# Patient Record
Sex: MALE | Race: WHITE | NOT HISPANIC OR LATINO | URBAN - METROPOLITAN AREA
[De-identification: names, ages, dates, MRNs, and addresses within clinical notes are randomized per-mention and may not be internally consistent; named-entity substitution may affect disease eponyms.]

---

## 2024-02-27 VITALS
OXYGEN SATURATION: 99 % | DIASTOLIC BLOOD PRESSURE: 73 MMHG | WEIGHT: 142.42 LBS | TEMPERATURE: 98 F | HEIGHT: 67 IN | SYSTOLIC BLOOD PRESSURE: 120 MMHG | RESPIRATION RATE: 16 BRPM | HEART RATE: 97 BPM

## 2024-02-27 NOTE — ASU PREOP CHECKLIST - 3.
Pt stated that he has severe Anxiety and would like to be medicated before going into Sx. As per pt, Surgeon is aware of this issue. Told pt that the Anesthesiologist will see him prior to Sx.

## 2024-02-28 ENCOUNTER — INPATIENT (INPATIENT)
Facility: HOSPITAL | Age: 29
LOS: 1 days | Discharge: HOME HEALTH | DRG: 572 | End: 2024-03-01
Attending: SURGERY | Admitting: SURGERY
Payer: COMMERCIAL

## 2024-02-28 DIAGNOSIS — Z98.890 OTHER SPECIFIED POSTPROCEDURAL STATES: Chronic | ICD-10-CM

## 2024-02-28 LAB
GRAM STN FLD: ABNORMAL
GRAM STN FLD: SIGNIFICANT CHANGE UP
SPECIMEN SOURCE: SIGNIFICANT CHANGE UP

## 2024-02-28 PROCEDURE — 88304 TISSUE EXAM BY PATHOLOGIST: CPT | Mod: 26

## 2024-02-28 RX ORDER — CEFAZOLIN SODIUM 1 G
2000 VIAL (EA) INJECTION EVERY 8 HOURS
Refills: 0 | Status: COMPLETED | OUTPATIENT
Start: 2024-02-29 | End: 2024-02-29

## 2024-02-28 RX ORDER — SODIUM CHLORIDE 9 MG/ML
250 INJECTION, SOLUTION INTRAVENOUS ONCE
Refills: 0 | Status: COMPLETED | OUTPATIENT
Start: 2024-02-28 | End: 2024-02-28

## 2024-02-28 RX ORDER — OXYCODONE HYDROCHLORIDE 5 MG/1
5 TABLET ORAL EVERY 6 HOURS
Refills: 0 | Status: DISCONTINUED | OUTPATIENT
Start: 2024-02-28 | End: 2024-03-01

## 2024-02-28 RX ORDER — ACETAMINOPHEN 500 MG
1000 TABLET ORAL ONCE
Refills: 0 | Status: COMPLETED | OUTPATIENT
Start: 2024-02-28 | End: 2024-02-28

## 2024-02-28 RX ORDER — OXYCODONE HYDROCHLORIDE 5 MG/1
10 TABLET ORAL EVERY 6 HOURS
Refills: 0 | Status: DISCONTINUED | OUTPATIENT
Start: 2024-02-28 | End: 2024-03-01

## 2024-02-28 RX ORDER — ONDANSETRON 8 MG/1
4 TABLET, FILM COATED ORAL EVERY 6 HOURS
Refills: 0 | Status: DISCONTINUED | OUTPATIENT
Start: 2024-02-28 | End: 2024-03-01

## 2024-02-28 RX ORDER — APREPITANT 80 MG/1
40 CAPSULE ORAL ONCE
Refills: 0 | Status: COMPLETED | OUTPATIENT
Start: 2024-02-28 | End: 2024-02-28

## 2024-02-28 RX ADMIN — APREPITANT 40 MILLIGRAM(S): 80 CAPSULE ORAL at 17:14

## 2024-02-28 RX ADMIN — SODIUM CHLORIDE 1500 MILLILITER(S): 9 INJECTION, SOLUTION INTRAVENOUS at 19:55

## 2024-02-28 RX ADMIN — Medication 1000 MILLIGRAM(S): at 17:14

## 2024-02-28 NOTE — BRIEF OPERATIVE NOTE - OPERATION/FINDINGS
Prone position, prepped and draped. Pilonidal abscess cavity noted at midline, immediately superior to gluteal cleft, with multiple connecting sinuses x4. Cultures taken from all sinuses and sent for speciation. Elliptical incision made overlying central sinus tract, dissection continued to level of muscle with healthy tissue noted at the base. Sinus tracts probed and found to be connecting to central cavity. Purulence expressed. Mechanical debridement performed with iodine packing strips. Pulse lavage with NS mixed with Gentamicin. Hemostasis achieved. Vac and packing placed.

## 2024-02-28 NOTE — ASU DISCHARGE PLAN (ADULT/PEDIATRIC) - CARE PROVIDER_API CALL
Chano Maria  Surgery  [address]  [place] [address]  Phone: [phone]  Fax: [phone]  Follow Up Time:

## 2024-02-28 NOTE — ASU DISCHARGE PLAN (ADULT/PEDIATRIC) - NS MD DC FALL RISK RISK
For information on Fall & Injury Prevention, visit: [email or web address] OR  [email or web address] OR  https://www.cdc.gov/steadi/patient.html

## 2024-02-28 NOTE — ASU DISCHARGE PLAN (ADULT/PEDIATRIC) - ASU DC SPECIAL INSTRUCTIONSFT
Please take pain medication as prescribed.    1. Wash your hands with soap and warm water for at least 15 seconds.   2. Set your supplies on your workspace.  - Non sterile gloves  - Gauze pads if needed to clean or dry the wound  - Foam sponge dressing  - Clean scissors to cut drape and foam to size  3. Put on non-sterile gloves. Remove the old dressing.  4. Gently clean your wound with soap and water. If your doctor ordered cleaning solution, use that to clean your wound.  5. Check your wounds for signs of infection.  6. Open the new foam sponge dressing. Cut it to size. Place it in the wound.  7. Connect the tubing to the sponge dressing.  8. Connect the tubing to the pump unit.  9. Open the drape package. Cut the drape to the size needed.  10. Place the drape over the wound site. Smooth the drape as you stick it around the wound to prevent any wrinkle that may leak.  11. Turn on the VAC pump. Listen and watch for leaks. Use pieces from what you cut off the drape to seal any leaks around the edges of the drape or the tubing.    General Discharge Instructions:  Please resume all regular home medications unless specifically advised not to take a particular medication. Also, please take any new medications as prescribed.  Please get plenty of rest, continue to ambulate several times per day, and drink adequate amounts of fluids. Avoid lifting weights greater than 5-10 lbs until you follow-up with your surgeon, who will instruct you further regarding activity restrictions.  Avoid driving or operating heavy machinery while taking pain medications.  Please follow-up with your surgeon and Primary Care Provider (PCP) as advised.  Incision Care:  *Please call your doctor or nurse practitioner if you have increased pain, swelling, redness, or drainage from the incision site.  *Avoid swimming and baths until your follow-up appointment.  *You may shower, and wash surgical incisions with a mild soap and warm water. Gently pat the area dry.  *If you have staples, they will be removed at your follow-up appointment.  *If you have steri-strips, they will fall off on their own. Please remove any remaining strips 7-10 days after surgery. Please take pain medication as prescribed.    1. Wash your hands with soap and warm water for at least 15 seconds.   2. Set your supplies on your workspace.  - Non sterile gloves  - Gauze pads if needed to clean or dry the wound  - Foam sponge dressing  - Clean scissors to cut drape and foam to size  3. Put on non-sterile gloves. Remove the old dressing.  4. Gently clean your wound with soap and water. If your doctor ordered cleaning solution, use that to clean your wound.  5. Check your wounds for signs of infection.  6. Open the new foam sponge dressing. Cut it to size. Place it in the wound.  7. Connect the tubing to the sponge dressing.  8. Connect the tubing to the pump unit.  9. Open the drape package. Cut the drape to the size needed.  10. Place the drape over the wound site. Smooth the drape as you stick it around the wound to prevent any wrinkle that may leak.  11. Turn on the VAC pump. Listen and watch for leaks. Use pieces from what you cut off the drape to seal any leaks around the edges of the drape or the tubing.    If there is any malfunctions or issues with the wound vac please utilize a wet to dry wound dressing until any issue with the wound vac can be addressed.     General Discharge Instructions:  Please resume all regular home medications unless specifically advised not to take a particular medication. Also, please take any new medications as prescribed.  Please get plenty of rest, continue to ambulate several times per day, and drink adequate amounts of fluids. Avoid lifting weights greater than 5-10 lbs until you follow-up with your surgeon, who will instruct you further regarding activity restrictions.  Avoid driving or operating heavy machinery while taking pain medications.  Please follow-up with your surgeon and Primary Care Provider (PCP) as advised.  Incision Care:  *Please call your doctor or nurse practitioner if you have increased pain, swelling, redness, or drainage from the incision site.  *Avoid swimming and baths until your follow-up appointment.  *You may shower, and wash surgical incisions with a mild soap and warm water. Gently pat the area dry.  *If you have staples, they will be removed at your follow-up appointment.  *If you have steri-strips, they will fall off on their own. Please remove any remaining strips 7-10 days after surgery.

## 2024-02-28 NOTE — BRIEF OPERATIVE NOTE - NSICDXBRIEFPROCEDURE_GEN_ALL_CORE_FT
PROCEDURES:  Complex excision, pilonidal sinus 28-Feb-2024 20:20:25  Amaris Fierro  Extensive excision of pilonidal cyst 28-Feb-2024 20:20:41  Amaris Fierro

## 2024-02-29 RX ORDER — HEPARIN SODIUM 5000 [USP'U]/ML
5000 INJECTION INTRAVENOUS; SUBCUTANEOUS EVERY 8 HOURS
Refills: 0 | Status: DISCONTINUED | OUTPATIENT
Start: 2024-02-29 | End: 2024-03-01

## 2024-02-29 RX ADMIN — Medication 100 MILLIGRAM(S): at 18:29

## 2024-02-29 RX ADMIN — OXYCODONE HYDROCHLORIDE 10 MILLIGRAM(S): 5 TABLET ORAL at 18:29

## 2024-02-29 RX ADMIN — HEPARIN SODIUM 5000 UNIT(S): 5000 INJECTION INTRAVENOUS; SUBCUTANEOUS at 18:29

## 2024-02-29 RX ADMIN — Medication 100 MILLIGRAM(S): at 10:23

## 2024-02-29 RX ADMIN — Medication 100 MILLIGRAM(S): at 02:00

## 2024-02-29 NOTE — DISCHARGE NOTE NURSING/CASE MANAGEMENT/SOCIAL WORK - PATIENT PORTAL LINK FT
You can access the FollowMyHealth Patient Portal offered by [facility] by registering at the following website: [email or web address]. By joining [facility] [facility]’s FollowMyHealth portal, you will also be able to view your health information using other applications (apps) compatible with our system.

## 2024-02-29 NOTE — DISCHARGE NOTE NURSING/CASE MANAGEMENT/SOCIAL WORK - NSDCPEEMAIL_GEN_ALL_CORE
[facility] for Tobacco Control email [email or web address]

## 2024-02-29 NOTE — DISCHARGE NOTE NURSING/CASE MANAGEMENT/SOCIAL WORK - NSDCDMENAME_GEN_ALL_CORE_FT
[employer] (pending acceptance) [employer]

## 2024-02-29 NOTE — DISCHARGE NOTE NURSING/CASE MANAGEMENT/SOCIAL WORK - NSDCPEWEB_GEN_ALL_CORE
[facility] for Tobacco Control website --- [email or web address] website --- [email or web address]

## 2024-03-01 VITALS
RESPIRATION RATE: 17 BRPM | HEART RATE: 75 BPM | DIASTOLIC BLOOD PRESSURE: 75 MMHG | TEMPERATURE: 98 F | SYSTOLIC BLOOD PRESSURE: 114 MMHG | OXYGEN SATURATION: 97 %

## 2024-03-01 LAB
-  CEFTRIAXONE: SIGNIFICANT CHANGE UP
-  PENICILLIN: SIGNIFICANT CHANGE UP
-  VANCOMYCIN: SIGNIFICANT CHANGE UP
GRAM STN FLD: ABNORMAL
METHOD TYPE: SIGNIFICANT CHANGE UP
METHOD TYPE: SIGNIFICANT CHANGE UP

## 2024-03-01 PROCEDURE — 87070 CULTURE OTHR SPECIMN AEROBIC: CPT

## 2024-03-01 PROCEDURE — 87184 SC STD DISK METHOD PER PLATE: CPT

## 2024-03-01 PROCEDURE — 88304 TISSUE EXAM BY PATHOLOGIST: CPT

## 2024-03-01 PROCEDURE — 87205 SMEAR GRAM STAIN: CPT

## 2024-03-01 PROCEDURE — 87181 SC STD AGAR DILUTION PER AGT: CPT

## 2024-03-01 PROCEDURE — 87075 CULTR BACTERIA EXCEPT BLOOD: CPT

## 2024-03-01 RX ADMIN — OXYCODONE HYDROCHLORIDE 10 MILLIGRAM(S): 5 TABLET ORAL at 09:59

## 2024-03-01 RX ADMIN — HEPARIN SODIUM 5000 UNIT(S): 5000 INJECTION INTRAVENOUS; SUBCUTANEOUS at 01:50

## 2024-03-01 RX ADMIN — OXYCODONE HYDROCHLORIDE 10 MILLIGRAM(S): 5 TABLET ORAL at 10:59

## 2024-03-01 NOTE — PROGRESS NOTE ADULT - ASSESSMENT
28M PMHx anxiety, severe pilonidal disease s/p multiple I&Ds no PSHx now s/p elective pilonidal abscess cavity excision and debridement of tracts with vac placement (2/28).    23 hr obs   Regular diet  Pain/nausea control prn  Ancef x24 hours  SCDs/HSQ  OOBA/IS  Toradol  DC home today w/ vac   
28M PMHx anxiety, severe pilonidal disease s/p multiple I&Ds no PSHx now s/p elective pilonidal abscess cavity excision and debridement of tracts with vac placement (2/28).    Regular diet  Ancef x24 hours  SCDs/OOBA/IS  Pain and nausea control PRN  
28M PMHx anxiety, severe pilonidal disease s/p multiple I&Ds no PSHx now s/p elective pilonidal abscess cavity excision and debridement of tracts with vac placement (2/28)    Regular diet  Ancef x24 hours  SCDs/OOBA/IS  Toradol  Pain and nausea control PRN  23 hour obs, DC to home after afternoon meeting with Dr. Maria

## 2024-03-02 LAB
CULTURE RESULTS: ABNORMAL
ORGANISM # SPEC MICROSCOPIC CNT: ABNORMAL
ORGANISM # SPEC MICROSCOPIC CNT: ABNORMAL
ORGANISM # SPEC MICROSCOPIC CNT: SIGNIFICANT CHANGE UP
SPECIMEN SOURCE: SIGNIFICANT CHANGE UP

## 2024-03-03 LAB
CULTURE RESULTS: ABNORMAL
SPECIMEN SOURCE: SIGNIFICANT CHANGE UP

## 2024-03-08 LAB — SURGICAL PATHOLOGY STUDY: SIGNIFICANT CHANGE UP

## 2024-03-11 DIAGNOSIS — B96.89 OTHER SPECIFIED BACTERIAL AGENTS AS THE CAUSE OF DISEASES CLASSIFIED ELSEWHERE: ICD-10-CM

## 2024-03-11 DIAGNOSIS — F41.9 ANXIETY DISORDER, UNSPECIFIED: ICD-10-CM

## 2024-03-11 DIAGNOSIS — L05.01 PILONIDAL CYST WITH ABSCESS: ICD-10-CM

## (undated) DEVICE — SLV COMPRESSION KNEE MED

## (undated) DEVICE — WARMING BLANKET FULL UNDERBODY

## (undated) DEVICE — DRSG STERISTRIPS 0.5 X 2"

## (undated) DEVICE — GLV 8 PROTEXIS (WHITE)

## (undated) DEVICE — DRSG CURITY GAUZE SPONGE 4 X 4" 12-PLY

## (undated) DEVICE — SUT VICRYL 3-0 18" SH (POP-OFF)

## (undated) DEVICE — DRAPE TOWEL BLUE 17" X 24"

## (undated) DEVICE — APPLICATOR Q TIP 6" WOOD STEM

## (undated) DEVICE — ELCTR BOVIE TIP BLADE INSULATED 2.75" EDGE

## (undated) DEVICE — SUT VICRYL 2-0 27" SH

## (undated) DEVICE — TAPE SILK 3"

## (undated) DEVICE — SUT MONOCRYL 4-0 18" PS-2

## (undated) DEVICE — DRSG DERMABOND 0.7ML

## (undated) DEVICE — DRSG VAC GRANUFOAM SMALL (BLACK)

## (undated) DEVICE — IRR SYS BONE CLNNG INTERPULSE